# Patient Record
Sex: FEMALE | Race: WHITE | URBAN - METROPOLITAN AREA
[De-identification: names, ages, dates, MRNs, and addresses within clinical notes are randomized per-mention and may not be internally consistent; named-entity substitution may affect disease eponyms.]

---

## 2020-01-21 ENCOUNTER — Encounter (OUTPATIENT)
Dept: URBAN - METROPOLITAN AREA CLINIC 75 | Facility: CLINIC | Age: 70
End: 2020-01-21
Payer: MEDICARE

## 2020-01-21 PROCEDURE — 92134 CPTRZ OPH DX IMG PST SGM RTA: CPT | Performed by: OPTOMETRIST

## 2020-01-21 PROCEDURE — 92004 COMPRE OPH EXAM NEW PT 1/>: CPT | Performed by: OPTOMETRIST

## 2020-03-03 ENCOUNTER — OFFICE VISIT (OUTPATIENT)
Dept: URBAN - METROPOLITAN AREA CLINIC 71 | Facility: CLINIC | Age: 70
End: 2020-03-03
Payer: MEDICARE

## 2020-03-03 DIAGNOSIS — H52.223 REGULAR ASTIGMATISM, BILATERAL: ICD-10-CM

## 2020-03-03 DIAGNOSIS — H25.813 COMBINED FORMS OF AGE-RELATED CATARACT, BILATERAL: Primary | ICD-10-CM

## 2020-03-03 PROCEDURE — 92136 OPHTHALMIC BIOMETRY: CPT | Performed by: OPHTHALMOLOGY

## 2020-03-03 PROCEDURE — 92014 COMPRE OPH EXAM EST PT 1/>: CPT | Performed by: OPHTHALMOLOGY

## 2020-03-03 RX ORDER — CIPROFLOXACIN HYDROCHLORIDE 3.5 MG/ML
0.3 % SOLUTION/ DROPS TOPICAL
Qty: 1 | Refills: 1 | Status: INACTIVE
Start: 2020-03-03 | End: 2021-01-13

## 2020-03-03 ASSESSMENT — INTRAOCULAR PRESSURE
OS: 11
OD: 11

## 2020-03-03 ASSESSMENT — PACHYMETRY
OD: 3.06
OS: 3.09
OS: 22.23
OD: 22.46

## 2020-03-03 NOTE — IMPRESSION/PLAN
Impression: Regular astigmatism, bilateral: H52.223.  Plan: PATIENT DOES NOT WANT TO SURGICALLY CORRECT, WILL CORRECT WITH GLASSES

## 2020-03-03 NOTE — IMPRESSION/PLAN
Impression: Vitreous degeneration, bilateral: H43.813.  Plan: FOLLOW CLINICALLY WITH DR Carmen Cobian

## 2020-03-04 NOTE — IMPRESSION/PLAN
Impression: Combined forms of age-related cataract, bilateral: H25.813. Plan: Discussed diagnosis in detail with patient. Discussed treatment options with patient. Discussed risks and benefits and patient understands. Reassured patient of current condition and treatment. Discussed signs and symptoms of retinal detachment. Discussed signs and symptoms of PVD/floaters. Medication instillation reviewed and understood. Lid scrubs and hygiene. were explained. Pre op instructions given and understood. Post op instructions given and understood. Patient elects to have surgery. Educational materials provided: Cataract extraction/lens. IOL SA60AT FAR TRADITIONAL OD 22.0, OS 23.0, PATIENT ELECTS TO HAVE TRIMOXI WITH CIPRO DROPS BID X 10 DAYS STARTING DAY AFTER SURGERY. Surgical risks and benefits were discussed, explained and understood by patient.

## 2020-03-12 ENCOUNTER — SURGERY (OUTPATIENT)
Dept: URBAN - METROPOLITAN AREA SURGERY 45 | Facility: SURGERY | Age: 70
End: 2020-03-12
Payer: MEDICARE

## 2020-03-12 ENCOUNTER — SURGERY (OUTPATIENT)
Dept: URBAN - METROPOLITAN AREA SURGERY 44 | Facility: SURGERY | Age: 70
End: 2020-03-12
Payer: MEDICARE

## 2020-03-12 PROCEDURE — 66984 XCAPSL CTRC RMVL W/O ECP: CPT | Performed by: OPHTHALMOLOGY

## 2020-03-13 ENCOUNTER — POST-OPERATIVE VISIT (OUTPATIENT)
Dept: URBAN - METROPOLITAN AREA CLINIC 75 | Facility: CLINIC | Age: 70
End: 2020-03-13

## 2020-03-13 PROCEDURE — 99024 POSTOP FOLLOW-UP VISIT: CPT | Performed by: OPTOMETRIST

## 2020-03-13 ASSESSMENT — INTRAOCULAR PRESSURE
OS: 11
OD: 11

## 2020-03-13 NOTE — IMPRESSION/PLAN
Impression: S/P CE/Standard IOL OD - 1 Day. Presence of intraocular lens  Z96.1.  Plan: looks good okay to proceed with the left eye

## 2020-03-17 ENCOUNTER — POST-OPERATIVE VISIT (OUTPATIENT)
Dept: URBAN - METROPOLITAN AREA CLINIC 71 | Facility: CLINIC | Age: 70
End: 2020-03-17

## 2020-03-17 PROCEDURE — 99024 POSTOP FOLLOW-UP VISIT: CPT | Performed by: OPHTHALMOLOGY

## 2020-03-17 ASSESSMENT — INTRAOCULAR PRESSURE
OD: 14
OS: 16

## 2020-03-17 NOTE — IMPRESSION/PLAN
Impression: S/P CE/Standard IOL OD - 5 Days. Presence of intraocular lens  Z96.1.  Plan: - looks good healing well- ok to proceed with the left eye --Continue Ofloxacin 0.3% 2x a day for 10 days for OD then start with the OS after

## 2020-04-09 ENCOUNTER — SURGERY (OUTPATIENT)
Dept: URBAN - METROPOLITAN AREA SURGERY 45 | Facility: SURGERY | Age: 70
End: 2020-04-09
Payer: MEDICARE

## 2020-05-07 ENCOUNTER — SURGERY (OUTPATIENT)
Dept: URBAN - METROPOLITAN AREA SURGERY 44 | Facility: SURGERY | Age: 70
End: 2020-05-07
Payer: MEDICARE

## 2020-05-07 PROCEDURE — 66984 XCAPSL CTRC RMVL W/O ECP: CPT | Performed by: OPHTHALMOLOGY

## 2020-05-08 ENCOUNTER — POST-OPERATIVE VISIT (OUTPATIENT)
Dept: URBAN - METROPOLITAN AREA CLINIC 75 | Facility: CLINIC | Age: 70
End: 2020-05-08

## 2020-05-08 PROCEDURE — 99024 POSTOP FOLLOW-UP VISIT: CPT | Performed by: OPTOMETRIST

## 2020-05-08 ASSESSMENT — INTRAOCULAR PRESSURE
OD: 9
OS: 15

## 2020-05-08 NOTE — IMPRESSION/PLAN
Impression: S/P CE/Standard IOL OS - 1 Day. Presence of intraocular lens  Z96.1.  Plan: LOOKS GOOD, CONTINUE DROPS AS DIRECTED, GIVING THE PATIENT ARTIFICIAL TEARS TO USE

## 2020-05-21 ENCOUNTER — POST-OPERATIVE VISIT (OUTPATIENT)
Dept: URBAN - METROPOLITAN AREA CLINIC 75 | Facility: CLINIC | Age: 70
End: 2020-05-21

## 2020-05-21 PROCEDURE — 99024 POSTOP FOLLOW-UP VISIT: CPT | Performed by: OPTOMETRIST

## 2020-05-21 ASSESSMENT — INTRAOCULAR PRESSURE
OD: 10
OS: 9

## 2020-05-28 ENCOUNTER — POST-OPERATIVE VISIT (OUTPATIENT)
Dept: URBAN - METROPOLITAN AREA CLINIC 75 | Facility: CLINIC | Age: 70
End: 2020-05-28
Payer: COMMERCIAL

## 2020-05-28 PROCEDURE — 99024 POSTOP FOLLOW-UP VISIT: CPT | Performed by: OPTOMETRIST

## 2020-05-28 ASSESSMENT — INTRAOCULAR PRESSURE
OD: 11
OS: 9

## 2020-05-28 ASSESSMENT — VISUAL ACUITY
OS: 20/30
OD: 20/40

## 2020-05-28 NOTE — IMPRESSION/PLAN
Impression:  Presence of intraocular lens  Z96.1. Plan: Schedule Pt for a 6 month DFE cap check. Refraction Done today. Visual Acuity at 20/30 OU not as good as anticipated.

## 2020-06-25 ENCOUNTER — OFFICE VISIT (OUTPATIENT)
Dept: URBAN - METROPOLITAN AREA CLINIC 75 | Facility: CLINIC | Age: 70
End: 2020-06-25

## 2020-06-25 DIAGNOSIS — H52.4 PRESBYOPIA: Primary | ICD-10-CM

## 2020-06-25 PROCEDURE — V2799 MISC VISION ITEM OR SERVICE: HCPCS | Performed by: OPTOMETRIST

## 2020-06-25 ASSESSMENT — VISUAL ACUITY
OS: 20/40
OD: 20/30

## 2020-12-28 ENCOUNTER — OFFICE VISIT (OUTPATIENT)
Dept: URBAN - METROPOLITAN AREA CLINIC 75 | Facility: CLINIC | Age: 70
End: 2020-12-28
Payer: MEDICARE

## 2020-12-28 DIAGNOSIS — Z96.1 PRESENCE OF INTRAOCULAR LENS: ICD-10-CM

## 2020-12-28 DIAGNOSIS — H26.493 OTHER BILATERAL SECONDARY CATARACT: Primary | ICD-10-CM

## 2020-12-28 PROCEDURE — 99213 OFFICE O/P EST LOW 20 MIN: CPT | Performed by: OPTOMETRIST

## 2020-12-28 ASSESSMENT — INTRAOCULAR PRESSURE
OS: 10
OD: 12

## 2020-12-28 NOTE — IMPRESSION/PLAN
Impression: Other bilateral secondary cataract: H26.493. Plan: OU: Discussed diagnosis in detail with patient. Discussed treatment options with patient. Discussed possible YAG laser OU in future.

## 2020-12-28 NOTE — IMPRESSION/PLAN
Impression: Presbyopia: H52.4. Plan: Pt very unhappy with previous post-op glasses & rechecks. Discussed having pt see optical for further evaluation due too glasses reading today being different than previous noted Rx's. Also discussed re-doing full vision exam under vision insurance.

## 2021-01-13 ENCOUNTER — OFFICE VISIT (OUTPATIENT)
Dept: URBAN - METROPOLITAN AREA CLINIC 75 | Facility: CLINIC | Age: 71
End: 2021-01-13
Payer: MEDICARE

## 2021-01-13 DIAGNOSIS — H35.033 HYPERTENSIVE RETINOPATHY, BILATERAL: ICD-10-CM

## 2021-01-13 DIAGNOSIS — H43.813 VITREOUS DEGENERATION, BILATERAL: ICD-10-CM

## 2021-01-13 PROCEDURE — 92014 COMPRE OPH EXAM EST PT 1/>: CPT | Performed by: OPTOMETRIST

## 2021-01-13 ASSESSMENT — INTRAOCULAR PRESSURE
OD: 11
OS: 11

## 2021-01-13 NOTE — IMPRESSION/PLAN
Impression: Retinal hemorrhage, right eye: H35.61. Plan: Discussed findings w/pt. Will continue to observe. 

** R/C in 6 weeks

## 2021-02-25 ENCOUNTER — OFFICE VISIT (OUTPATIENT)
Dept: URBAN - METROPOLITAN AREA CLINIC 75 | Facility: CLINIC | Age: 71
End: 2021-02-25
Payer: MEDICARE

## 2021-02-25 DIAGNOSIS — H35.61 RETINAL HEMORRHAGE, RIGHT EYE: Primary | ICD-10-CM

## 2021-02-25 PROCEDURE — 92014 COMPRE OPH EXAM EST PT 1/>: CPT | Performed by: OPTOMETRIST

## 2021-02-25 ASSESSMENT — INTRAOCULAR PRESSURE
OD: 12
OS: 13

## 2021-02-25 NOTE — IMPRESSION/PLAN
[facility] [place]   [address] [place] [address]  [phone]    Lo Irizarry   [address]  [facility] [address]         1/[birth year]2017         Dear Lo    Your test results from your last visit have returned.  Your lab work for urine anallysis was all normal.              If you have any further questions please feel free to call.        Sincerely,  MIRELLA Montano    Impression: Retinal hemorrhage, right eye: H35.61- Resolved. Plan: Discussed findings w/pt. Will continue to observe.

## 2021-02-25 NOTE — IMPRESSION/PLAN
Impression: Vitreous degeneration, bilateral: H43.813. Plan: All signs and risks of retinal detachment and tears were discussed in detail. Patient instructed to call office immediately if any symptoms noted.

## 2023-02-15 ENCOUNTER — OFFICE VISIT (OUTPATIENT)
Dept: URBAN - METROPOLITAN AREA CLINIC 75 | Facility: CLINIC | Age: 73
End: 2023-02-15
Payer: MEDICARE

## 2023-02-15 DIAGNOSIS — H02.889 MEIBOMIAN GLAND DYSFUNCTION OF EYE: ICD-10-CM

## 2023-02-15 DIAGNOSIS — H01.009 BLEPHARITIS OF EYELID: Primary | ICD-10-CM

## 2023-02-15 DIAGNOSIS — H04.123 DRY EYE SYNDROME OF BILATERAL LACRIMAL GLANDS: ICD-10-CM

## 2023-02-15 PROCEDURE — 99213 OFFICE O/P EST LOW 20 MIN: CPT | Performed by: OPTOMETRIST

## 2023-02-15 ASSESSMENT — INTRAOCULAR PRESSURE
OS: 10
OD: 13

## 2023-02-15 NOTE — IMPRESSION/PLAN
Impression: Dry eye syndrome of bilateral lacrimal glands: H04.123. Plan: Recommended pt continue OTC Lubrication BID OU and add gel ointment at night before bedtime.

## 2023-02-15 NOTE — IMPRESSION/PLAN
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
Accounting for itching and scratching & crusted corners Plan: Blepharitis is inflammation of the eyelids that is not contagious. It is a chronic condition that will flare up periodically if not treat it on a regular basis. Blepharitis may improve with warm compresses, lid scrubs, and ophthalmic antibiotics. The patient should apply warm compress to affected eyes for 5 minutes at a time, 3 to 4 times a day. The patient should perform lid scrubs every night following the warm compress. The patient can use artificial tears as needed for ocular irritation. Contact office if eyelid redness, crusting, discharge, or blurry vision does not improve or gets worse. Artificial Tears : apply one drop of artificial tears (ie: Theratears or opptiv sensitive persevative free) in each eye three times per day Lid Scrubs : gently cleanse lids and lashes and then rinse with water Warm Compresses : apply warm compresses to the lids once a day.

## 2023-02-15 NOTE — IMPRESSION/PLAN
Impression: Meibomian gland dysfunction of eye: H02.889. Plan: Meibomian gland dysfunction is inflammation of the oil glands in the eye lids, coupled with thickening of the oily secretions. It often accompanies blepharitis, the skin condition rosacea, and it makes dry eye syndrome worse Meibomian gland dysfunction may improve with therapy consisting of warm compresses, topical ophthalmic antibiotic/steroid combinations, oral antibiotics, and oral anti-inflammatory agents (such as omega-3-fatty acids and fish oil) . Danyell Killian Contact office if meibomian gland dysfunction does not improve or worsens despite treatment.